# Patient Record
(demographics unavailable — no encounter records)

---

## 2024-12-05 NOTE — PHYSICAL EXAM
[FreeTextEntry1] : Medical assistant present for duration of physical examination   General no acute distress, alert and oriented Psych calm, pleasant demeanor, responding appropriately to questions Nonlabored breathing Ambulating without assistance Skin normal color and pigment, no visible lesions or rashes    Anorectal Exam: Inspection no erythema, induration or fluctuance, no skin excoriation, superficial appearing posterior midline anal fissure, minimal external hemorrhoidal tissue JOE nontender, no masses palpated, no blood on gloved finger   Procedure: Anoscopy   Pre procedure Diagnosis: anal fissure Post procedure Diagnosis: anal fissure Anesthesia: none Estimated blood loss: none Specimen: none Complications: none   Consent obtained. Anoscopy was performed by passing a lighted anoscope with lubricant jelly into the anal canal and the entire anal mucosal surface was inspected. Findings included superficial appearing posterior midline anal fissure, mild internal hemorrhoids, no visible masses or lesions in anal canal   Patient tolerated examination and procedure well.

## 2024-12-05 NOTE — HISTORY OF PRESENT ILLNESS
[FreeTextEntry1] : 29 yo F presents for initial evaluation.  Referred by: Dr. Samantha Cheema Referral Reason: anal fissure  PMH: denies PSH: denies FH: paternal grandfather- colon cancer Medications: spironolactone Allergies: NKDA Social history: social ETOH Last Colonoscopy: denies  Patient presents for initial evaluation.  She reports this is her third episode this year of rectal pain and bleeding. The prior two times she was not evaluated and the symptoms stopped after a few days. This time, she notes more bleeding and pain than prior. She does endorse anal play/ insertion prior to the pain and bleeding beginning on Monday.  Denies rectal itching, burning. No prolapsing tissue per rectum.   BH: 1x/ soft formed stools No fiber supplements or stool softeners  NO AC or NSAIDS in past 7 days.

## 2024-12-05 NOTE — ASSESSMENT
[FreeTextEntry1] : Exam findings and diagnosis were discussed at length with patient.  Recommendations including increased fiber intake, adequate daily hydration, stool softeners as needed, and sitz baths as needed and after bowel movements were discussed. Avoid constipation, diarrhea, pushing or straining. Medical management, such diltiazem cream TID, was discussed. Rx provided. 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 Recommend follow up in 6 weeks or sooner as needed if symptoms persist or progress. All questions answered, patient expressed understanding and is agreeable to this plan.